# Patient Record
(demographics unavailable — no encounter records)

---

## 2024-11-18 NOTE — HISTORY OF PRESENT ILLNESS
[FreeTextEntry1] : Follow-up after penile laceration.  Patient sustained a laceration over the weekend secondary to a mechanical drill.  Patient full-thickness skin laceration on the dorsal aspect of the base of the phallus.  This was closed in the emergency room.  His dressing was removed through today was dry and clean.  Bacitracin applied to the wound.  Encouraged local wound management.  Follow-up 1 month.  Please refer to [facility] Consult Note.

## 2024-11-18 NOTE — LETTER BODY
[FreeTextEntry1] :  Reason for Visit: Penile laceration   This is a 47 year-old gentleman with penile laceration. Patient is referred for hospital follow-up. Patient sustained a penile laceration over the weekend secondary to a mechanical drill. Patient with full-thickness skin laceration on the dorsal aspect of the base of the phallus. This was closed in the emergency room. Patient denies any gross hematuria or dysuria or urinary incontinence. The patient denies any aggravating or relieving factors. The patient denies any interference of function. The patient is entirely asymptomatic. All other review of systems are negative. He has no cancer in his family medical history. He has no previous surgical history. Past medical history, family history and social history were inquired and were noncontributory to current condition. The patient does not use tobacco or drink alcohol. Medications and allergies were reviewed. He has no known allergies to medication.     On examination, the patient is a healthy-appearing gentleman in no acute distress. He is alert and oriented follows commands. He has normal mood and affect. He is normocephalic. Oral no thrush. Neck is supple. Respirations are unlabored. His abdomen is soft and nontender. Liver is nonpalpable. Bladder is nonpalpable. No CVA tenderness. Neurologically he is grossly intact. No peripheral edema. Skin without gross abnormality. His dressing was removed today. His wound was dry and clean. Bacitracin was applied to the wound.     Assessment: Penile laceration     I counseled the patient. I discussed the various etiologies of his symptoms. His dressing was removed today. His wound was dry and clean. A course of Bacitracin was applied to the wound. I encouraged the patient to continue with local wound management. He will follow-up in 1 month to ensure stability.  Patient will avoid sexual activity until his wound is completely healed.  Risks and alternatives were discussed. I answered the patient questions. The patient will follow-up as directed and will contact me with any questions or concerns. Thank you for the opportunity to participate in the care of this patient. I'll keep you updated on his progress.     Plan: Local wound management. Follow-up in 1 month.

## 2024-11-18 NOTE — ADDENDUM
[FreeTextEntry1] : Entered by Chidi Wise, acting as scribe for Dr. Roman Rivera. The documentation recorded by the scribe accurately reflects the service I personally performed and the decisions made by me.

## 2024-12-19 NOTE — LETTER BODY
[FreeTextEntry1] :  This is a 47 year-old gentleman with penile laceration. Patient previously sustained a penile laceration secondary to a mechanical drill. He had a full-thickness skin laceration on the dorsal aspect of the base of the phallus. This was closed in the emergency room. Patient returns today for follow-up. Since he was last seen, patient denies any gross hematuria or dysuria or urinary incontinence. The patient denies any aggravating or relieving factors. The patient denies any interference of function. The patient is entirely asymptomatic. All other review of systems are negative. He has no cancer in his family medical history. He has no previous surgical history. Past medical history, family history and social history were inquired and were noncontributory to current condition. The patient does not use tobacco or drink alcohol. Medications and allergies were reviewed. He has no known allergies to medication.    On examination, the patient is a healthy-appearing gentleman in no acute distress. He is alert and oriented follows commands. He has normal mood and affect. He is normocephalic. Oral no thrush. Neck is supple. Respirations are unlabored. His abdomen is soft and nontender. Liver is nonpalpable. Bladder is nonpalpable. No CVA tenderness. Neurologically he is grossly intact. No peripheral edema. Skin without gross abnormality.   Patient has a transverse scar at the base of his phallus.. On examination the incision is healed. No evidence of infection.  Physical examination today was chaperoned by Claudia Espino RN (Jessie)  Assessment: Penile laceration,  Resolved    I counseled the patient. On examination the incision is healed. No evidence of infection. I reassured the patient. Risks and alternatives were discussed. I answered the patient questions. The patient will follow-up as directed and will contact me with any questions or concerns. Thank you for the opportunity to participate in the care of this patient. I'll keep you updated on his progress.    Plan: Follow-up as directed.

## 2024-12-19 NOTE — HISTORY OF PRESENT ILLNESS
[FreeTextEntry1] :   Follow-up after penile laceration for mechanical drill injury.    Examination the incision is healed.  No evidence of infection.    Physical examination today was chaperoned by Claudia Espino RN (Jessie)     Reassured patient.  Follows directed.  Please refer to [facility] Consult Note.